# Patient Record
Sex: MALE | Race: WHITE
[De-identification: names, ages, dates, MRNs, and addresses within clinical notes are randomized per-mention and may not be internally consistent; named-entity substitution may affect disease eponyms.]

---

## 2019-12-18 ENCOUNTER — HOSPITAL ENCOUNTER (OUTPATIENT)
Dept: HOSPITAL 11 - JP.SDS | Age: 64
Discharge: HOME | End: 2019-12-18
Attending: ORTHOPAEDIC SURGERY
Payer: COMMERCIAL

## 2019-12-18 DIAGNOSIS — E78.5: ICD-10-CM

## 2019-12-18 DIAGNOSIS — S46.812A: ICD-10-CM

## 2019-12-18 DIAGNOSIS — S43.492A: ICD-10-CM

## 2019-12-18 DIAGNOSIS — M24.112: ICD-10-CM

## 2019-12-18 DIAGNOSIS — Z91.048: ICD-10-CM

## 2019-12-18 DIAGNOSIS — E11.9: ICD-10-CM

## 2019-12-18 DIAGNOSIS — M75.32: Primary | ICD-10-CM

## 2019-12-18 DIAGNOSIS — S43.082A: ICD-10-CM

## 2019-12-18 DIAGNOSIS — M10.9: ICD-10-CM

## 2019-12-18 DIAGNOSIS — J44.1: ICD-10-CM

## 2019-12-18 DIAGNOSIS — M19.012: ICD-10-CM

## 2019-12-18 DIAGNOSIS — M18.11: ICD-10-CM

## 2019-12-18 DIAGNOSIS — E66.9: ICD-10-CM

## 2019-12-18 DIAGNOSIS — I10: ICD-10-CM

## 2019-12-18 DIAGNOSIS — M67.814: ICD-10-CM

## 2019-12-18 DIAGNOSIS — M94.212: ICD-10-CM

## 2019-12-18 DIAGNOSIS — E66.01: ICD-10-CM

## 2019-12-18 DIAGNOSIS — M65.812: ICD-10-CM

## 2019-12-18 DIAGNOSIS — Z88.8: ICD-10-CM

## 2019-12-18 PROCEDURE — 29822 SHO ARTHRS SRG LMTD DBRDMT: CPT

## 2019-12-18 PROCEDURE — 29826 SHO ARTHRS SRG DECOMPRESSION: CPT

## 2019-12-18 PROCEDURE — 29827 SHO ARTHRS SRG RT8TR CUF RPR: CPT

## 2019-12-18 PROCEDURE — C1713 ANCHOR/SCREW BN/BN,TIS/BN: HCPCS

## 2019-12-18 NOTE — OR
DATE OF PROCEDURE:  12/18/2019

 

SURGEON:  Joel Ware MD

 

PREOPERATIVE DIAGNOSES:

1. Calcific tendonitis, left shoulder.

2. Subluxation, biceps tendon, left shoulder and possible labral tear.

 

POSTOPERATIVE DIAGNOSES:

1. Calcific tendonitis, left shoulder, supraspinous.

2. Severe biceps tendinopathy with subluxation from the bicipital groove.

3. Mild partial tear, subscapularis tendon.

4. Mild tendinopathy, articular surface rotator cuff, supraspinous and 
infraspinatus.

5. Degenerative labral tear and osteoarthritis of the glenohumeral joint with a 
small area

    of full-thickness loss of articular cartilage in the anterior glenoid.

 

PROCEDURE:

1. Arthroscopy, left shoulder with.

    a.     Biceps tenotomy.

    b.     Debridement of glenohumeral joint including labrum and mild 
synovitis.

    c.     Subacromial decompression with acromioplasty.

    d.     Excision of large calcific tendonitis deposit, anterior cuff and 
repair of rotator cuff

            secondary to defect within the cuff from the calcific tendonitis.

 

ANESTHESIA:  Interscalene block with general anesthesia.

 

INDICATIONS:  Mr. Mtz is a 64-year-old gentleman, who has been having 
significant pain in

the left shoulder for the past couple of weeks.  He reports this shoulder had 
some mild achy

pain on occasion over the past couple of years; however, while working and 
lifting, recently

experienced rather sudden onset of increased pain necessitating him leaving 
work.

Examination and MRI are consistent with a large deposit of calcific tendonitis 
and

subluxation of the biceps tendon from the groove with partial subscapularis 
tear.  He now

presents for evaluation of the joint with biceps tenotomy, debridement of 
labrum as needed,

excision of calcific tendonitis, and repair of rotator cuff including 
subscapularis as

necessary.  Risks, benefits, and potential complications of the procedure were 
discussed.

 

DESCRIPTION OF PROCEDURE:  After adequate anesthesia was obtained, the patient 
was placed in

lateral decubitus position and secured with a bean bag positioner.  Left 
shoulder and arm

were then prepped and draped in a sterile fashion.  10 pounds of traction was 
placed in a

shoulder traction unit.  Posterior portal was established.  Glenohumeral joint 
was inspected

and this revealed some fairly significant degenerative changes throughout the 
joint with

tattered labrum, primarily anterior and superior.  Detachment of the labrum from

approximately the equator up over the superior rim.  Significant biceps 
tendinopathy with a

partial-thickness tear and subluxation of the biceps out of the bicipital 
groove sitting in

front of the subscapularis.  Subscapularis itself showed a partial-thickness 
tear, primarily

on the superior surface with the majority of the tendon intact.  Mild synovitis 
was present

throughout the joint.  Articular surfaces showed significant degenerative 
change of the

glenoid with an area of full-thickness loss anteriorly.  Chondromalacia of the 
humeral head

was noted without full-thickness loss at this point.  Undersurface of the 
rotator cuff

showed some mild fraying without full-thickness tear, particularly along the 
anterior

aspect.  Anterior portal was established.  Labrum was debrided.  All loose 
articular areas

of the glenoid were also debrided.  Ablation wand was used to perform a biceps 
tenotomy from

its anchor on the superior labrum.  This was allowed to retract into the groove.

Undersurface of the supraspinous was very lightly debrided with a shaver and 
further

inspected with no evidence of full-thickness tear.  All loose fragments were 
removed.  



Scope was then withdrawn and placed into the subacromial space.  Lateral portal 
was established.

Bursa was resected for visualization.  Mild-to-moderate amount of bursitis was 
present.

Evidence of impingement on the coracoacromial ligament was noted with bruising.

Coracoacromial ligament was removed from the undersurface of the acromion with 
the ablation

wand.  Anterolateral edge was delineated and a ervin was then used to perform an

acromioplasty resecting the anterolateral aspect of the acromion, bevelling 
this medially

and posteriorly.  The bursal surface of the rotator cuff was inspected.  This 
revealed no

evidence of full-thickness tear.  A moderate bulge or prominence in the very 
anterior

lateral aspect of the supraspinous was noted.  A spinal needle was used to 
probe this and

calcific deposits were present within this.  Shaver was used to lightly de-roof 
the area of

the deposit and white toothpaste-like substance was expressed from the tendon 
consistent

with a calcific tendonitis.  This was debrided with a shaver.  These fragments 
were debrided

from the joint and the tendon was evaluated.  This revealed a defect within the 
tendon from

the calcific tendonitis, which resulted in only approximately 20% of the 
attachment of the

cuff still intact on the articular side.  Decision was made to proceed with 
repair of this.

A ervin was used to lightly decorticate the tuberosity at this location.  The 
cuff portion

which was still attached, again composed of only about 20% of the thickness of 
the tendon,

was sharply detached from the tuberosity.  A [employer] Healix anchor was then 
placed.  One limb

of each of the suture pairs was then passed through the tendon, one slightly 
anterior and

one slightly posterior.  These were then tied down in standard arthroscopic 
technique

securing the partial-thickness tear.  Remainder of the cuff was intact.  Scope 
was

withdrawn.  Port sites were closed in a standard fashion.  Sterile dressing was 
applied.

The patient tolerated the procedure very well.  There were no complications.  
He was taken

from the operating room in stable condition.

 

 

 

 

Joel Ware MD

DD:  12/18/2019 14:05:17

DT:  12/18/2019 19:28:10

Job #:  [id number]

MTDD

## 2021-04-17 ENCOUNTER — HOSPITAL ENCOUNTER (EMERGENCY)
Dept: HOSPITAL 11 - JP.ED | Age: 66
Discharge: HOME | End: 2021-04-17
Payer: MEDICAID

## 2021-04-17 DIAGNOSIS — I25.10: ICD-10-CM

## 2021-04-17 DIAGNOSIS — Z79.82: ICD-10-CM

## 2021-04-17 DIAGNOSIS — Z91.048: ICD-10-CM

## 2021-04-17 DIAGNOSIS — Z79.4: ICD-10-CM

## 2021-04-17 DIAGNOSIS — Z88.8: ICD-10-CM

## 2021-04-17 DIAGNOSIS — R05: Primary | ICD-10-CM

## 2021-04-17 DIAGNOSIS — R09.82: ICD-10-CM

## 2021-04-17 DIAGNOSIS — E11.9: ICD-10-CM

## 2021-04-17 PROCEDURE — 86140 C-REACTIVE PROTEIN: CPT

## 2021-04-17 PROCEDURE — 99283 EMERGENCY DEPT VISIT LOW MDM: CPT

## 2021-04-17 PROCEDURE — 71046 X-RAY EXAM CHEST 2 VIEWS: CPT

## 2021-04-17 PROCEDURE — 85025 COMPLETE CBC W/AUTO DIFF WBC: CPT

## 2021-04-17 PROCEDURE — 84484 ASSAY OF TROPONIN QUANT: CPT

## 2021-04-17 PROCEDURE — 36415 COLL VENOUS BLD VENIPUNCTURE: CPT

## 2021-04-17 PROCEDURE — 80048 BASIC METABOLIC PNL TOTAL CA: CPT

## 2021-04-17 NOTE — EDM.PDOC
ED HPI GENERAL MEDICAL PROBLEM





- General


Chief Complaint: Respiratory Problem


Stated Complaint: SEVERE COUGHING


Time Seen by Provider: 04/17/21 10:03


Source of Information: Reports: Patient, RN Notes Reviewed


History Limitations: Reports: No Limitations





- History of Present Illness


INITIAL COMMENTS - FREE TEXT/NARRATIVE: 





65-year-old gentleman presents emergency department today complaint of cough. He

states the cough started at 4 this morning and has been difficult to overcome. 

He states it hurts to take a deep breath he is also 1 month out from open heart 

surgery he denies any chest pressure no nausea vomiting no diaphoresis no fevers





- Related Data


                                    Allergies











Allergy/AdvReac Type Severity Reaction Status Date / Time


 


lisinopril Allergy Severe Anaphylactic Verified 04/17/21 09:41





   Shock  


 


adhesive Allergy  Rash Verified 04/17/21 09:41


 


adhesive tape Allergy  Rash Verified 04/17/21 09:41


 


insulin glargine, human Allergy  Rash Verified 04/17/21 09:41





recombin. a     





[From Lantus]     











Home Meds: 


                                    Home Meds





Insulin Detemir [Levemir] 50 unit SUBCUT BEDTIME 03/25/15 [History]


Multivitamin [Multi-Vitamin Daily] 1 each PO DAILY 03/25/15 [History]


metFORMIN [Glucophage] 1,000 mg PO BID 03/25/15 [History]


Canagliflozin [Invokana] 100 mg PO ACBREAKFAST 02/04/21 [History]


Furosemide 20 mg PO DAILY 02/04/21 [History]


Potassium Citrate 20 meq PO BID 02/04/21 [History]


Rosuvastatin [Crestor] 10 mg PO DAILY 02/04/21 [History]


Spironolactone [Aldactone] 25 mg PO DAILY 02/16/21 [History]


allopurinoL [Zyloprim] 300 mg PO DAILY 02/16/21 [History]


carvediloL [Carvedilol] 12.5 mg PO BID 02/16/21 [History]


Digoxin [Digox] 125 mcg PO DAILY 02/22/21 [History]


Aspirin 325 mg PO DAILY 04/17/21 [History]


Hydrocodone/Acetaminophen [Hydrocodon-Acetaminophen 5-325] 1 tab PO Q4H PRN 

04/17/21 [History]


Nitroglycerin 0.3 mg SL ASDIRECTED 04/17/21 [History]


Omeprazole 20 mg PO DAILY 04/17/21 [History]











Past Medical History


HEENT History: Reports: Impaired Vision


Cardiovascular History: Reports: Bypass, CAD


Musculoskeletal History: Reports: Other (See Below)


Other Musculoskeletal History: left shoulder pain


Endocrine/Metabolic History: Reports: Diabetes, Type II


Hematologic History: Reports: None


Immunologic History: Reports: None


Oncologic (Cancer) History: Reports: None


Dermatologic History: Reports: None





- Infectious Disease History


Infectious Disease History: Reports: Chicken Pox, Shingles





- Past Surgical History


Head Surgeries/Procedures: Reports: None


HEENT Surgical History: Reports: None


Cardiovascular Surgical History: Reports: Coronary Artery Bypass


Male  Surgical History: Reports: Lithotripsy ([facility])


Musculoskeletal Surgical History: Reports: None, Shoulder Surgery


Other Musculoskeletal Surgeries/Procedures:: left shoulder scope with SAD, RCR 

12/18/19





Social & Family History





- Family History


Family Medical History: No Pertinent Family History





- Tobacco Use


Tobacco Use Status *Q: Never Tobacco User





- Caffeine Use


Caffeine Use: Reports: None





ED ROS GENERAL





- Review of Systems


Review Of Systems: See Below


Constitutional: Reports: No Symptoms


HEENT: Reports: Sinus Problem (Postnasal drip), Throat Pain, Throat Swelling


Respiratory: Reports: Cough.  Denies: Sputum


Cardiovascular: Reports: No Symptoms


GI/Abdominal: Reports: No Symptoms





ED EXAM, GENERAL





- Physical Exam


Exam: See Below


Exam Limited By: No Limitations


General Appearance: Alert, No Apparent Distress, Obese


Nose: Normal Inspection, Normal Mucosa, No Blood


Throat/Mouth: Normal Inspection, Normal Lips, Normal Teeth, Normal Gums, Normal 

Oropharynx, Normal Voice, No Airway Compromise


Head: Atraumatic, Normocephalic


Neck: Normal Inspection, Supple, Non-Tender, Full Range of Motion


Respiratory/Chest: No Respiratory Distress, Lungs Clear, Normal Breath Sounds, 

No Accessory Muscle Use, Chest Non-Tender


Cardiovascular: Regular Rate, Rhythm, No Murmur


GI/Abdominal: Soft, Non-Tender





Course





- Vital Signs


Last Recorded V/S: 


                                Last Vital Signs











Temp  98.6 F   04/17/21 09:37


 


Pulse  86   04/17/21 09:37


 


Resp  17 04/17/21 09:37


 


BP  161/87 H  04/17/21 09:37


 


Pulse Ox  95   04/17/21 09:37














- Orders/Labs/Meds


Orders: 


                               Active Orders 24 hr











 Category Date Time Status


 


 Cardiac Monitoring [RC] .As Directed Care  04/17/21 10:08 Active


 


 Chest 2V [CR] Stat Exams  04/17/21 10:09 Taken











Labs: 


                                Laboratory Tests











  04/17/21 04/17/21 Range/Units





  10:16 10:16 


 


WBC  8.4   (4.5-11.0)  K/uL


 


RBC  4.39   (4.30-5.90)  M/uL


 


Hgb  13.1   (12.0-15.0)  g/dL


 


Hct  40.3   (40.0-54.0)  %


 


MCV  92   (80-98)  fL


 


MCH  30   (27-31)  pg


 


MCHC  33   (32-36)  %


 


Plt Count  204   (150-400)  K/uL


 


Neut % (Auto)  72 H   (36-66)  %


 


Lymph % (Auto)  15 L   (24-44)  %


 


Mono % (Auto)  11 H   (2-6)  %


 


Eos % (Auto)  1 L   (2-4)  %


 


Baso % (Auto)  0   (0-1)  %


 


Sodium   138 L  (140-148)  mmol/L


 


Potassium   4.4  (3.6-5.2)  mmol/L


 


Chloride   99 L  (100-108)  mmol/L


 


Carbon Dioxide   28  (21-32)  mmol/L


 


Anion Gap   15.4 H  (5.0-14.0)  mmol/L


 


BUN   14  (7-18)  mg/dL


 


Creatinine   1.1  (0.8-1.3)  mg/dL


 


Est Cr Clr Drug Dosing   67.84  mL/min


 


Estimated GFR (MDRD)   > 60  (>60)  


 


Glucose   232 H  ()  mg/dL


 


Calcium   9.9  (8.5-10.1)  mg/dL


 


Troponin I   < 0.017  (0.000-0.056)  ng/mL


 


C-Reactive Protein   3.10 H  (0.0-0.3)  mg/dL











Meds: 


Medications














Discontinued Medications














Generic Name Dose Route Start Last Admin





  Trade Name Freq  PRN Reason Stop Dose Admin


 


Al Hydroxide/Mg Hydroxide 15  0 ml  04/17/21 11:06  04/17/21 11:11





  ml/ Lidocaine HCl 15 ml  PO  04/17/21 11:07  15 ml





  ONETIME ONE   Administration


 


Al Hydroxide/Mg Hydroxide 15  0 ml  04/17/21 11:59 





  ml/ Lidocaine HCl 15 ml  PO  04/17/21 12:00 





  ONETIME ONE  


 


Guaifenesin/Codeine Phosphate  10 ml  04/17/21 10:09  04/17/21 10:14





  Codeine/Guaifenesin 100mg-10 Mg/5 Ml Syrup 10 Ml Cup  PO  04/17/21 10:10  10 

ml





  ONETIME ONE   Administration














Departure





- Departure


Time of Disposition: 12:01


Disposition: Home, Self-Care 01


Condition: Fair


Clinical Impression: 


 Cough, Postnasal drip








- Discharge Information


Instructions:  Postnasal Drip


Referrals: 


PCP,None [Primary Care Provider] - 


Forms:  ED Department Discharge


Additional Instructions: 


Usually GI cocktail if needed, try some of the over-the-counter medications for 

sinus congestion or try your nasal washes at home,  please followup with your 

primary care provider in 3-5 days if not better, please call return to the 

emergency department with worsening of symptoms.





Sepsis Event Note (ED)





- Evaluation


Sepsis Screening Result: No Definite Risk





- Focused Exam


Vital Signs: 


                                   Vital Signs











  Temp Pulse Resp BP Pulse Ox


 


 04/17/21 09:37  98.6 F  86  17  161/87 H  95














- My Orders


Last 24 Hours: 


My Active Orders





04/17/21 10:08


Cardiac Monitoring [RC] .As Directed 





04/17/21 10:09


Chest 2V [CR] Stat 














- Assessment/Plan


Last 24 Hours: 


My Active Orders





04/17/21 10:08


Cardiac Monitoring [RC] .As Directed 





04/17/21 10:09


Chest 2V [CR] Stat 











Plan: 





Assessment





Acuity = acute





Site and laterality = cough





Etiology  = probable postnasal drip





Manifestations = none





Location of injury =  Home





Lab values = CBC CMP unremarkable CRP elevated 3.1 troponin is negative chest x-

ray I did review films myself I cannot appreciate any acute process, the 

official read from radiology is pending





Plan


He received minimal relief from Robitussin-AC did receive good relief from GI 

cocktail, he is going to try some nasal saline washes at home or some of the 

over-the-counter medications to dry up your sinuses follow-up primary care 3 to 

5 days if not better

















 This note was dictated using dragon voice recognition software please call with

 any questions on syntax or grammar.

## 2021-04-19 NOTE — CR
CHEST: 2 view

 

CLINICAL HISTORY:Cough

 

COMPARISON:2014

 

FINDINGS:  The heart size, pulmonary vascularity and hilar structures are

normal. No infiltrate effusion or pneumothorax is seen. There has been previous

sternotomy. There are atherosclerotic changes in the aorta.

 

IMPRESSION: No acute cardiopulmonary process.